# Patient Record
Sex: FEMALE | Race: BLACK OR AFRICAN AMERICAN | NOT HISPANIC OR LATINO | URBAN - METROPOLITAN AREA
[De-identification: names, ages, dates, MRNs, and addresses within clinical notes are randomized per-mention and may not be internally consistent; named-entity substitution may affect disease eponyms.]

---

## 2023-10-03 ENCOUNTER — EMERGENCY (EMERGENCY)
Facility: HOSPITAL | Age: 73
LOS: 1 days | Discharge: HOME | End: 2023-10-03
Attending: EMERGENCY MEDICINE | Admitting: EMERGENCY MEDICINE
Payer: MEDICARE

## 2023-10-03 VITALS
DIASTOLIC BLOOD PRESSURE: 80 MMHG | TEMPERATURE: 98 F | RESPIRATION RATE: 18 BRPM | HEART RATE: 73 BPM | OXYGEN SATURATION: 99 % | SYSTOLIC BLOOD PRESSURE: 134 MMHG

## 2023-10-03 LAB
ALBUMIN SERPL ELPH-MCNC: 4.5 G/DL — SIGNIFICANT CHANGE UP (ref 3.3–5)
ALP SERPL-CCNC: 79 U/L — SIGNIFICANT CHANGE UP (ref 40–120)
ALT FLD-CCNC: 17 U/L — SIGNIFICANT CHANGE UP (ref 4–33)
ANION GAP SERPL CALC-SCNC: 11 MMOL/L — SIGNIFICANT CHANGE UP (ref 7–14)
APTT BLD: 36.5 SEC — HIGH (ref 24.5–35.6)
AST SERPL-CCNC: 18 U/L — SIGNIFICANT CHANGE UP (ref 4–32)
BASOPHILS # BLD AUTO: 0.02 K/UL — SIGNIFICANT CHANGE UP (ref 0–0.2)
BASOPHILS NFR BLD AUTO: 0.4 % — SIGNIFICANT CHANGE UP (ref 0–2)
BILIRUB SERPL-MCNC: 0.5 MG/DL — SIGNIFICANT CHANGE UP (ref 0.2–1.2)
BUN SERPL-MCNC: 12 MG/DL — SIGNIFICANT CHANGE UP (ref 7–23)
CALCIUM SERPL-MCNC: 9.6 MG/DL — SIGNIFICANT CHANGE UP (ref 8.4–10.5)
CHLORIDE SERPL-SCNC: 108 MMOL/L — HIGH (ref 98–107)
CO2 SERPL-SCNC: 24 MMOL/L — SIGNIFICANT CHANGE UP (ref 22–31)
CREAT SERPL-MCNC: 0.91 MG/DL — SIGNIFICANT CHANGE UP (ref 0.5–1.3)
EGFR: 66 ML/MIN/1.73M2 — SIGNIFICANT CHANGE UP
EOSINOPHIL # BLD AUTO: 0.09 K/UL — SIGNIFICANT CHANGE UP (ref 0–0.5)
EOSINOPHIL NFR BLD AUTO: 2 % — SIGNIFICANT CHANGE UP (ref 0–6)
GLUCOSE SERPL-MCNC: 99 MG/DL — SIGNIFICANT CHANGE UP (ref 70–99)
HCT VFR BLD CALC: 42.9 % — SIGNIFICANT CHANGE UP (ref 34.5–45)
HGB BLD-MCNC: 14 G/DL — SIGNIFICANT CHANGE UP (ref 11.5–15.5)
IANC: 2.49 K/UL — SIGNIFICANT CHANGE UP (ref 1.8–7.4)
IMM GRANULOCYTES NFR BLD AUTO: 0.2 % — SIGNIFICANT CHANGE UP (ref 0–0.9)
INR BLD: 1.14 RATIO — SIGNIFICANT CHANGE UP (ref 0.85–1.18)
LYMPHOCYTES # BLD AUTO: 1.57 K/UL — SIGNIFICANT CHANGE UP (ref 1–3.3)
LYMPHOCYTES # BLD AUTO: 34.4 % — SIGNIFICANT CHANGE UP (ref 13–44)
MCHC RBC-ENTMCNC: 26.5 PG — LOW (ref 27–34)
MCHC RBC-ENTMCNC: 32.6 GM/DL — SIGNIFICANT CHANGE UP (ref 32–36)
MCV RBC AUTO: 81.3 FL — SIGNIFICANT CHANGE UP (ref 80–100)
MONOCYTES # BLD AUTO: 0.39 K/UL — SIGNIFICANT CHANGE UP (ref 0–0.9)
MONOCYTES NFR BLD AUTO: 8.5 % — SIGNIFICANT CHANGE UP (ref 2–14)
NEUTROPHILS # BLD AUTO: 2.49 K/UL — SIGNIFICANT CHANGE UP (ref 1.8–7.4)
NEUTROPHILS NFR BLD AUTO: 54.5 % — SIGNIFICANT CHANGE UP (ref 43–77)
NRBC # BLD: 0 /100 WBCS — SIGNIFICANT CHANGE UP (ref 0–0)
NRBC # FLD: 0 K/UL — SIGNIFICANT CHANGE UP (ref 0–0)
PLATELET # BLD AUTO: 282 K/UL — SIGNIFICANT CHANGE UP (ref 150–400)
POTASSIUM SERPL-MCNC: 4.4 MMOL/L — SIGNIFICANT CHANGE UP (ref 3.5–5.3)
POTASSIUM SERPL-SCNC: 4.4 MMOL/L — SIGNIFICANT CHANGE UP (ref 3.5–5.3)
PROT SERPL-MCNC: 7.3 G/DL — SIGNIFICANT CHANGE UP (ref 6–8.3)
PROTHROM AB SERPL-ACNC: 12.8 SEC — SIGNIFICANT CHANGE UP (ref 9.5–13)
RBC # BLD: 5.28 M/UL — HIGH (ref 3.8–5.2)
RBC # FLD: 14.2 % — SIGNIFICANT CHANGE UP (ref 10.3–14.5)
SODIUM SERPL-SCNC: 143 MMOL/L — SIGNIFICANT CHANGE UP (ref 135–145)
WBC # BLD: 4.57 K/UL — SIGNIFICANT CHANGE UP (ref 3.8–10.5)
WBC # FLD AUTO: 4.57 K/UL — SIGNIFICANT CHANGE UP (ref 3.8–10.5)

## 2023-10-03 PROCEDURE — 71045 X-RAY EXAM CHEST 1 VIEW: CPT | Mod: 26

## 2023-10-03 PROCEDURE — 93971 EXTREMITY STUDY: CPT | Mod: 26,LT

## 2023-10-03 PROCEDURE — 99282 EMERGENCY DEPT VISIT SF MDM: CPT

## 2023-10-03 PROCEDURE — 70498 CT ANGIOGRAPHY NECK: CPT | Mod: 26,MA

## 2023-10-03 PROCEDURE — 70496 CT ANGIOGRAPHY HEAD: CPT | Mod: 26,MA

## 2023-10-03 PROCEDURE — 99223 1ST HOSP IP/OBS HIGH 75: CPT

## 2023-10-03 PROCEDURE — 93010 ELECTROCARDIOGRAM REPORT: CPT

## 2023-10-03 RX ORDER — ACETAMINOPHEN 500 MG
650 TABLET ORAL EVERY 6 HOURS
Refills: 0 | Status: DISCONTINUED | OUTPATIENT
Start: 2023-10-03 | End: 2023-10-07

## 2023-10-03 RX ORDER — ATORVASTATIN CALCIUM 80 MG/1
10 TABLET, FILM COATED ORAL DAILY
Refills: 0 | Status: DISCONTINUED | OUTPATIENT
Start: 2023-10-03 | End: 2023-10-07

## 2023-10-03 RX ORDER — DIAZEPAM 5 MG
5 TABLET ORAL ONCE
Refills: 0 | Status: DISCONTINUED | OUTPATIENT
Start: 2023-10-03 | End: 2023-10-04

## 2023-10-03 RX ADMIN — Medication 650 MILLIGRAM(S): at 22:15

## 2023-10-03 RX ADMIN — Medication 650 MILLIGRAM(S): at 23:02

## 2023-10-03 NOTE — ED PROVIDER NOTE - NS ED ATTENDING STATEMENT MOD
This was a shared visit with the JEY. I reviewed and verified the documentation and independently performed the documented:

## 2023-10-03 NOTE — CONSULT NOTE ADULT - ASSESSMENT
73 F w/ left leg pain numbness, CTH with a small sphenoid plannum extra axial calcified meningioma, CTA negative.  Recc:    - no acute neurosurgical intervention   - mri brain w/wo  - mri T/L spine w/wo  - neurology eval    d/w attending

## 2023-10-03 NOTE — ED PROVIDER NOTE - CLINICAL SUMMARY MEDICAL DECISION MAKING FREE TEXT BOX
Genet ROSENBERG: 74 yo F with hx of preDM, high cholesterol here for evaluation of left leg numbness, dizziness. Patient went to urgent care yesterday, was given script for outpatient imaging for possible bleed/ stroke. Today patient felt weak on left side, had associated dizziness. On exam, she is slightly weaker on left leg compared to right. CN and UE strength intact. There is a questionable pronator drift on exam and dysmetria with finger to nose on left. Plan for labs, CT imaging, possible neuro evaluation.

## 2023-10-03 NOTE — CONSULT NOTE ADULT - ASSESSMENT
Impression:      Recommendations:    []  []  []  []  []      Case discussed with Neurology attending Dr. Guerra, to be seen on AM rounds, please await final attending attestation.  HARMEET CH is a 73y (1950) woman with a PMHx significant for pre-DM, HLD, peripheral neuropathy BIBEMS for evaluation of left leg numbness, difficulty ambulating and dizziness.     Impression:    1.  Left leg weakness and difficulty ambulating, chronic neuropathy L>R lower extremity with positive romberg. Patient also endorses pain of L thigh and L calf. Unclear etiology of left leg weakness. Less likely acute infarct or lumber radiculopathy, will need further evaluation   2.  8 x 7 x 4 mm calcified extra-axial lesion arising from the planum sphenoidale, likely incidental finding.     Recommendations:    [] Will follow MRI brain w/ and w/o contrast  [] Will follow results of MRI T/L spine  [] Patient to be re-assessed on AM rounds to evaluate need for further inpatient workup   [] PT/OT evaluation for safe dispo planning      Case discussed with Neurology attending Dr. Guerra, to be seen on AM rounds, please await final attending attestation.  HARMEET CH is a 73y (1950) woman with a PMHx significant for pre-DM, HLD, peripheral neuropathy BIBEMS for evaluation of left leg numbness, difficulty ambulating and dizziness.     Impression:    1.  Left leg weakness and difficulty ambulating, chronic neuropathy L>R lower extremity with positive romberg. Patient also endorses pain of L thigh and L calf. Unclear etiology of left leg weakness. Less likely acute infarct or lumber radiculopathy, will need further evaluation   2.  8 x 7 x 4 mm calcified extra-axial lesion arising from the planum sphenoidale, likely incidental finding.     Recommendations:    [] Will follow MRI brain w/ and w/o contrast  [] Will follow results of MRI T/L spine w/o  [] Patient to be re-assessed on AM rounds to evaluate need for further inpatient workup   [] PT/OT evaluation for safe dispo planning      Case discussed with Neurology attending Dr. Guerra, to be seen on AM rounds, please await final attending attestation.  HARMEET CH is a 73y (1950) woman with a PMHx significant for pre-DM, HLD, peripheral neuropathy BIBEMS for evaluation of left leg numbness, difficulty ambulating and dizziness.     Impression:    1.  Left leg weakness and difficulty ambulating, chronic neuropathy L>R lower extremity with positive romberg. Patient also endorses pain of L thigh and L calf. Unclear etiology of left leg weakness. Less likely acute infarct or lumber radiculopathy, will need further evaluation   2.  8 x 7 x 4 mm calcified extra-axial lesion arising from the planum sphenoidale, likely incidental finding.     Recommendations:    [x] Will follow MRI brain w/ and w/o contrast  [x] Will follow results of MRI T/L spine w/o, lumbar stenosis   [x] Patient to be re-assessed on AM rounds to evaluate need for further inpatient workup   [] PT/OT evaluation for safe dispo planning  [] f/u outpt with neurosurg/spine specialist  [] ophtho outpatient for formal visual fields assessment due to location meningioma (location close to optic chiasm)  [] Patient can follow up with Neurology upon discharge, Dr. Cali Power, [address], [place], [place]; ([phone]).      Case discussed with Neurology attending Dr. Guerra, to be seen on AM rounds, please await final attending attestation.

## 2023-10-03 NOTE — ED ADULT NURSE REASSESSMENT NOTE - NS ED NURSE REASSESS COMMENT FT1
patient came to cdu from ER. alert and orientedx4. complaints of headache. tylenol given  asper order. call bell with in reach. oriented to plan of care. sinus rhythm on tele.

## 2023-10-03 NOTE — ED PROVIDER NOTE - NSICDXPASTMEDICALHX_GEN_ALL_CORE_FT
Neuro: A&Ox4.   Cardiac: SR. VSS. B/P: 145/65, T: 98.3, P: 57, R: 18  Respiratory: Sating >92% on RA, requiring 1-2L NC while asleep.  GI/: Adequate urine output. No BM this shift.  Diet/appetite: Regular diet.  Activity:  Stand by assist  Pain: Denies pain  Skin: No new deficits noted.  LDA's: PIV SL    DVT in RLE  Heparin gtt discontinued, PO Xarelto started  Possible discharge today    Plan: Continue with POC. Notify primary team with changes.     PAST MEDICAL HISTORY:  High cholesterol

## 2023-10-03 NOTE — ED ADULT NURSE NOTE - OBJECTIVE STATEMENT
Pt is alert and orientedx4, ambulatory at baseline. Pt presents to the ED with worsening tingling and numbness in the lower extremities. Pt says she was at urgent care and they advised her to get CT of brain but was not advised to go to ER. Pt then had fall from increased weakness and hurt left foot, no LOC, head trauma or AC use. Pt denies chest pain, shortness of breath, dyspnea on exertion, breathing is unlabored and even. Pt denies nausea, vomiting or diarrhea. 20G IV placed in left AC, labs drawn, awaiting further orders. Call bell within reach, bed in lowest position, will continue to monitor.

## 2023-10-03 NOTE — CONSULT NOTE ADULT - ATTENDING COMMENTS
Chronic left foot numbness and weakness with recent worsening.     Exam:  Motor:  Left leg:   HF 4-  Other muscles in the le+ -->5  Incomplete Chronic left foot numbness and weakness with recent worsening.     Exam:  VFF to CF    Motor:  Left leg:   HF 4-/5  Other muscles in the left le+ -->5/5  Other muscles - 5/5 throughout.     Reflexes 2 throughout.       A/P  Ms. Duque is a 72 yo woman with two issues:  - Severe lumbar stenosis possibly causing left leg symptoms.  Neurosurgery spine specialist f/u.    - Meningioma  Ophthalmology for formal VF testing.   Neurosurgery f/u  D/W patient and CDU team  Thank you

## 2023-10-03 NOTE — ED ADULT NURSE NOTE - NSFALLRISKINTERV_ED_ALL_ED

## 2023-10-03 NOTE — ED PROVIDER NOTE - OBJECTIVE STATEMENT
Genet ROSENBERG: Patient is a 74 yo F with history of pre-DM, high cholesterol BIBEMS for evaluation of left leg numbness and difficulty walking and dizziness. Patient states she has chronic left leg neuropathy which has been worse for the past 3 days. She states she feels a heaviness and tightness in her left leg. She went to urgent care yesterday for this complaint and intermittent dizziness and was given a script for outpatient CT/ MRI imaging. Today, patient states she had tightness, numbness, heaviness in her leg, attempted to sit down because she felt weak and dizzy and fell to the ground. Denies head trauma or loss of consciousness. Patient called her sister who felt the patient sounded bad on the phone. She states patient had labored breathing and was crying. She called 911. Episode lasted about 45 min. Patient reports feeling better when EMS arrived. Patient denies fevers, chills, nausea, vomiting, chest pain, shortness of breath, urinary symptoms, black or bloody stools. She reports dizziness is improved from before.

## 2023-10-03 NOTE — CONSULT NOTE ADULT - CONSULT REASON
Left leg weakness. dizziness, Meningioma Left leg weakness, left leg numbness, dizziness, meningioma

## 2023-10-03 NOTE — CONSULT NOTE ADULT - SUBJECTIVE AND OBJECTIVE BOX
Neurology - Consult Note    -  Spectra: [phone] ([facility]), [phone] ([facility])  -    HPI: Patient HARMEET CH is a 73y (1950) woman with a PMHx significant for pre-DM, high cholesterol BIBEMS for evaluation of left leg numbness and difficulty walking and dizziness. Patient states she has chronic left leg neuropathy which has been worse for the past 3 days. She states she feels a heaviness and tightness in her left leg. She went to urgent care yesterday for this complaint and intermittent dizziness and was given a script for outpatient CT/ MRI imaging. Today, patient states she had tightness, numbness, heaviness in her leg, attempted to sit down because she felt weak and dizzy and fell to the ground. Denies head trauma or loss of consciousness.       Patient called her sister who felt the patient sounded bad on the phone. She states patient had labored breathing and was crying. She called 911. Episode lasted about 45 min. Patient reports feeling better when EMS arrived. Patient denies fevers, chills, nausea, vomiting, chest pain, shortness of breath, urinary symptoms, black or bloody stools. She reports dizziness is improved from before.    Review of Systems:  INCOMPLETE   CONSTITUTIONAL: No fevers or chills  EYES AND ENT: No visual changes or no throat pain   NECK: No pain or stiffness  RESPIRATORY: No hemoptysis or shortness of breath  CARDIOVASCULAR: No chest pain or palpitations  GASTROINTESTINAL: No melena or hematochezia  GENITOURINARY: No dysuria or hematuria  NEUROLOGICAL: +As stated in HPI above  SKIN: No itching, burning, rashes, or lesions   All other review of systems is negative unless indicated above.    Allergies:  No Known Drug Allergies  Seafood (Hives)      PMHx/PSHx/Family Hx: As above, otherwise see below   High cholesterol        Social Hx:  No current use of tobacco, alcohol, or illicit drugs  Lives with ***    Medications:  MEDICATIONS  (STANDING):    MEDICATIONS  (PRN):      Vitals:  T(C): 36.7 (10-03-23 @ 13:56), Max: 36.7 (10-03-23 @ 13:56)  HR: 73 (10-03-23 @ 13:56) (73 - 73)  BP: 134/80 (10-03-23 @ 13:56) (134/80 - 134/80)  RR: 18 (10-03-23 @ 13:56) (18 - 18)  SpO2: 99% (10-03-23 @ 13:56) (99% - 99%)    Physical Examination: INCOMPLETE  General - NAD  Cardiovascular - Peripheral pulses palpable, no edema  Eyes - Fundoscopy with flat, sharp optic discs and no hemorrhage or exudates; Fundoscopy not well visualized; Fundoscopy not performed due to safety precautions in the setting of the COVID-19 pandemic    Neurologic Exam:  Mental status - Awake, Alert, Oriented to person, place, and time. Speech fluent, repetition and naming intact. Follows simple and complex commands. Attention/concentration, recent and remote memory (including registration and recall), and fund of knowledge intact    Cranial nerves - PERRLA, VFF, EOMI, face sensation (V1-V3) intact b/l, facial strength intact without asymmetry b/l, hearing intact b/l, palate with symmetric elevation, trapezius OR sternocleidomastiod 5/5 strength b/l, tongue midline on protrusion with full lateral movement    Motor - Normal bulk and tone throughout. No pronator drift.  Strength testing            Deltoid      Biceps      Triceps     Wrist Extension    Wrist Flexion     Interossei         R            5                 5               5                     5                              5                        5                 5  L             5                 5               5                     5                              5                        5                 5              Hip Flexion    Hip Extension    Knee Flexion    Knee Extension    Dorsiflexion    Plantar Flexion  R              5                           5                       5                           5                            5                          5  L              5                           5                        5                           5                            5                          5    Sensation - Light touch/temperature OR pain/vibration intact throughout    DTR's -             Biceps      Triceps     Brachioradialis      Patellar    Ankle    Toes/plantar response  R             2+             2+                  2+                       2+            2+                 Down  L              2+             2+                 2+                        2+           2+                 Down    Coordination - Finger to Nose intact b/l. No tremors appreciated    Gait and station - Normal casual gait. Romberg (-)    Labs:                        14.0   4.57  )-----------( 282      ( 03 Oct 2023 16:30 )             42.9     10-03    143  |  108<H>  |  12  ----------------------------<  99  4.4   |  24  |  0.91    Ca    9.6      03 Oct 2023 16:30    TPro  7.3  /  Alb  4.5  /  TBili  0.5  /  DBili  x   /  AST  18  /  ALT  17  /  AlkPhos  79  10-03    CAPILLARY BLOOD GLUCOSE      POCT Blood Glucose.: 77 mg/dL (03 Oct 2023 17:24)    LIVER FUNCTIONS - ( 03 Oct 2023 16:30 )  Alb: 4.5 g/dL / Pro: 7.3 g/dL / ALK PHOS: 79 U/L / ALT: 17 U/L / AST: 18 U/L / GGT: x             PT/INR - ( 03 Oct 2023 16:30 )   PT: 12.8 sec;   INR: 1.14 ratio         PTT - ( 03 Oct 2023 16:30 )  PTT:36.5 sec  CSF:                  Radiology:       Neurology - Consult Note    -  Spectra: [phone] ([facility]), [phone] ([facility])  -    HPI: Patient HARMEET CH is a 73y (1950) woman with a PMHx significant for pre-DM, HLD, peripheral neuropathy BIBEMS for evaluation of left leg numbness, difficulty ambulating and dizziness. Patient states she has chronic left leg neuropathy (2 years) which has been worse for the past 3 days. She states she feels a heaviness and tightness in her left leg. She went to urgent care yesterday for this complaint and intermittent dizziness and was given a script for outpatient CT/ MRI imaging. Today, patient states she had tightness, numbness, heaviness in her left leg, attempted to sit down because she felt weak and dizzy and fell to the ground around 1PM. Initially reported as a room spinning dizziness, patient reports she also felt as if she was spinning and that the dizziness improves with rest. Denies head trauma or loss of consciousness.     Patient called her sister who felt the patient sounded poorly on the phone. She states patient had labored breathing and was crying. Episode lasted about 45 min. Patient reports feeling better when EMS arrived. Patient denies fevers, chills, nausea, vomiting, chest pain, shortness of breath, urinary symptoms, black or bloody stools. She reports dizziness is improved from before. History is provided by sister and patient, however patient is poor historian, unclear if there is underlying cognitive decline. Patient able to ambulate while in ED. She endorses left calf and left thigh pain. Lower extremity duplex was negative for DVT. CT head was done which showed  8 x 7 x 4 mm calcified extra-axial lesion arising from the   planum sphenoidale. CTA H/N was unremarkable. Per neurosurgery, meningioma is likely incidental finding. Patient was seen by Podiatrist 2 years ago for left foot parathesias (she was not told etiology of neuropathy and was given medication for it, however she is off medication now and has not followed with podiatry. Has not seen Neurology outpatient.   Patient takes a statin daily, not on AC/AP.     Review of Systems:   CONSTITUTIONAL: No fevers or chills  EYES AND ENT: No visual changes or no throat pain   NECK: No pain or stiffness  RESPIRATORY: No hemoptysis or shortness of breath  CARDIOVASCULAR: No chest pain or palpitations  GASTROINTESTINAL: No melena or hematochezia  GENITOURINARY: No dysuria or hematuria  NEUROLOGICAL: +As stated in HPI above  SKIN: No itching, burning, rashes, or lesions   All other review of systems is negative unless indicated above.    Allergies:  No Known Drug Allergies  Seafood (Hives)    PMHx/PSHx/Family Hx: As above, otherwise see below   High cholesterol    Social Hx:  Former smoker but quit years ago. Occasionally drinks wine, denies illicit drug use  Lives alone, she is retired. Independent with ADLs, ambulates independently     Medications:  MEDICATIONS  (STANDING):    MEDICATIONS  (PRN):      Vitals:  T(C): 36.7 (10-03-23 @ 13:56), Max: 36.7 (10-03-23 @ 13:56)  HR: 73 (10-03-23 @ 13:56) (73 - 73)  BP: 134/80 (10-03-23 @ 13:56) (134/80 - 134/80)  RR: 18 (10-03-23 @ 13:56) (18 - 18)  SpO2: 99% (10-03-23 @ 13:56) (99% - 99%)    Physical Examination:   General - NAD  Cardiovascular - Peripheral pulses palpable, no edema  Eyes - Fundoscopy not performed due to safety precautions in the setting of the COVID-19 pandemic    Neurologic Exam:  Mental status - Awake, Alert, Oriented to person, place, and time (month and year). Speech fluent, repetition and naming intact. Follows simple. Attention/concentration, memory grossly intact, and fund of knowledge intact    Cranial nerves - PERRLA, VFF, EOMI. No nystagmus noted. Face sensation (V1-V3) intact b/l, facial strength intact without asymmetry b/l, hearing intact b/l, palate with symmetric elevation, trapezius 4+/5 strength b/l, tongue midline on protrusion with full lateral movement    Motor - Normal bulk and tone throughout. No pronator drift.  Strength testing            Deltoid      Biceps      Triceps     Wrist Extension    Wrist Flexion     Interossei         R            5                 5               5                     5                              5                        5                 5  L             5                 5               5                     5                              5                        5                 5              Hip Flexion    Hip Extension    Knee Flexion    Knee Extension    Dorsiflexion    Plantar Flexion  R              5                          5                    5                       5                  5                          5  L              4                           4+                  4+                    4                  5                          5    Sensation - Light touch, temperature, vibration, intact throughout. Proprioception intact B/L     DTR's -             Biceps      Triceps     Brachioradialis      Patellar    Ankle    Toes/plantar response  R             2+             2+                  2+              2+            2+                 Down  L              2+             2+                 2+               2+           2+                 Neutral     Coordination - Finger to Nose intact b/l. Heel to shin normal on L. On R, limited by left thigh pain. Mild intention tremor B/L    Gait and station - Normal casual gait. Romberg positive    Labs:                        14.0   4.57  )-----------( 282      ( 03 Oct 2023 16:30 )             42.9     10-03    143  |  108<H>  |  12  ----------------------------<  99  4.4   |  24  |  0.91    Ca    9.6      03 Oct 2023 16:30    TPro  7.3  /  Alb  4.5  /  TBili  0.5  /  DBili  x   /  AST  18  /  ALT  17  /  AlkPhos  79  10-03    CAPILLARY BLOOD GLUCOSE      POCT Blood Glucose.: 77 mg/dL (03 Oct 2023 17:24)    LIVER FUNCTIONS - ( 03 Oct 2023 16:30 )  Alb: 4.5 g/dL / Pro: 7.3 g/dL / ALK PHOS: 79 U/L / ALT: 17 U/L / AST: 18 U/L / GGT: x             PT/INR - ( 03 Oct 2023 16:30 )   PT: 12.8 sec;   INR: 1.14 ratio         PTT - ( 03 Oct 2023 16:30 )  PTT:36.5 sec    Radiology:    < from: CT Angio Neck w/ IV Cont (10.03.23 @ 17:01) >  CT BRAIN with and without contrast:  There is no acutehemorrhage, midline shift, or abnormal extra-axial   fluid collection.  There is an 8 x 7 x 4 mm calcified extra-axial lesion arising from the   planum sphenoidale suggesting a meningioma.    Ventricles, sulci, and cisterns are normal in size for thepatient's age.   No hydrocephalus. Basal cisterns are patent.    Paranasal sinuses and mastoid air cells are clear. Calvarium is intact.  Bilateral lens replacements.    CT ANGIOGRAPHY NECK:  There is no evidence for significant stenosis or major vessel occlusion   involving the bilateral carotid arteries.    There is no evidence for significant stenosis or major vessel occlusion   involving the bilateral vertebral arteries.    Partially visualized lungs are clear.    Multiple bilateral thyroid nodules measuring up to 1.2 cm in the left   isthmus.    Visualized osseous structures are unremarkable.      CT ANGIOGRAPHY BRAIN:    There is no evidence for significant stenosis or major vessel occlusion   about the [place].    There is no evidence for significant stenosis, major vessel occlusion, or   aneurysm involving the bilateral vertebral arteries. Basilar artery is   hypoplastic. Distal basilar artery is slightly ectatic.    No enlarged vascular lesions or clusters of abnormal vessels are noted to   suggest an arterial venous malformation within the field-of-view.      IMPRESSION:    CT BRAIN:  1.  There is an 8 x 7 x 4 mm calcified extra-axial lesion arising from   the planum sphenoidale suggesting a meningioma. This can be further   evaluated with nonemergent MRI brain with and without contrast.  2.  No acute intracranial hemorrhage, midline shift or extra-axial fluid   collection.    CT ANGIOGRAPHY NECK:  No evidence of hemodynamically significant stenosis using NASCET  criteria. Patent vertebral arteries. No evidence of vascular dissection.    CT ANGIOGRAPHY BRAIN:  No evidence of aneurysm.  No major vessel occlusion or proximal stenosis.       Neurology - Consult Note    -  Spectra: [phone] ([facility]), [phone] ([facility])  -    HPI: Patient HARMEET CH is a 73y (1950) woman with a PMHx significant for pre-DM, HLD, peripheral neuropathy BIBEMS for evaluation of left leg numbness, difficulty ambulating and dizziness. Patient states she has chronic left leg neuropathy (2 years) which has been worse for the past 3 days. She states she feels a heaviness and tightness in her left leg. She went to urgent care yesterday for this complaint and intermittent dizziness and was given a script for outpatient CT/ MRI imaging. Today, patient states she had tightness, numbness, heaviness in her left leg, attempted to sit down because she felt weak and dizzy and fell to the ground around 1PM. Initially reported as a room spinning dizziness, patient reports she also felt as if she was spinning and that the dizziness improves with rest. Denies head trauma or loss of consciousness.     Patient called her sister who felt the patient sounded poorly on the phone. She states patient had labored breathing and was crying. Episode lasted about 45 min. Patient reports feeling better when EMS arrived. Patient denies fevers, chills, nausea, vomiting, chest pain, shortness of breath, urinary symptoms, black or bloody stools. She reports dizziness is improved from before. History is provided by sister and patient, however patient is poor historian, unclear if there is underlying cognitive decline. Patient able to ambulate while in ED. She endorses left calf and left thigh pain. Lower extremity duplex was negative for DVT. CT head was done which showed  8 x 7 x 4 mm calcified extra-axial lesion arising from the planum sphenoidale. CTA H/N was unremarkable. Per neurosurgery, meningioma is likely incidental finding. Patient was seen by Podiatrist 2 years ago for left foot parathesias (she was not told etiology of neuropathy and was given medication for it, however she is off medication now and has not followed with podiatry. Has not seen Neurology outpatient.   Patient takes a statin daily, not on AC/AP.     Review of Systems:   CONSTITUTIONAL: No fevers or chills  EYES AND ENT: No visual changes or no throat pain   NECK: No pain or stiffness  RESPIRATORY: No hemoptysis or shortness of breath  CARDIOVASCULAR: No chest pain or palpitations  GASTROINTESTINAL: No melena or hematochezia  GENITOURINARY: No dysuria or hematuria  NEUROLOGICAL: +As stated in HPI above  SKIN: No itching, burning, rashes, or lesions   All other review of systems is negative unless indicated above.    Allergies:  No Known Drug Allergies  Seafood (Hives)    PMHx/PSHx/Family Hx: As above, otherwise see below   High cholesterol    Social Hx:  Former smoker but quit years ago. Occasionally drinks wine, denies illicit drug use  Lives alone, she is retired. Independent with ADLs, ambulates independently     Medications:  MEDICATIONS  (STANDING):    MEDICATIONS  (PRN):      Vitals:  T(C): 36.7 (10-03-23 @ 13:56), Max: 36.7 (10-03-23 @ 13:56)  HR: 73 (10-03-23 @ 13:56) (73 - 73)  BP: 134/80 (10-03-23 @ 13:56) (134/80 - 134/80)  RR: 18 (10-03-23 @ 13:56) (18 - 18)  SpO2: 99% (10-03-23 @ 13:56) (99% - 99%)    Physical Examination:   General - NAD  Cardiovascular - Peripheral pulses palpable, no edema  Eyes - Fundoscopy not performed due to safety precautions in the setting of the COVID-19 pandemic    Neurologic Exam:  Mental status - Awake, Alert, Oriented to person, place, and time (month and year). Speech fluent, repetition and naming intact. Follows simple. Attention/concentration, memory grossly intact, and fund of knowledge intact    Cranial nerves - PERRLA, VFF, EOMI. No nystagmus noted. Face sensation (V1-V3) intact b/l, facial strength intact without asymmetry b/l, hearing intact b/l, palate with symmetric elevation, trapezius 4+/5 strength b/l, tongue midline on protrusion with full lateral movement    Motor - Normal bulk and tone throughout. No pronator drift.  Strength testing            Deltoid      Biceps      Triceps     Wrist Extension    Wrist Flexion     Interossei         R            5                 5               5                     5                              5                        5                 5  L             5                 5               5                     5                              5                        5                 5              Hip Flexion    Hip Extension    Knee Flexion    Knee Extension    Dorsiflexion    Plantar Flexion  R              5                          5                    5                       5                  5                          5  L              4                           4+                  4+                    4                  5                          5    Sensation - Light touch, temperature, vibration, intact throughout. Proprioception intact B/L     DTR's -             Biceps      Triceps     Brachioradialis      Patellar    Ankle    Toes/plantar response  R             2+             2+                  2+              2+            2+                 Down  L              2+             2+                 2+               2+           2+                 Neutral     Coordination - Finger to Nose intact b/l. Heel to shin normal on L. On R, limited by left thigh pain. Mild intention tremor B/L    Gait and station - Normal casual gait. Romberg positive    Labs:                        14.0   4.57  )-----------( 282      ( 03 Oct 2023 16:30 )             42.9     10-03    143  |  108<H>  |  12  ----------------------------<  99  4.4   |  24  |  0.91    Ca    9.6      03 Oct 2023 16:30    TPro  7.3  /  Alb  4.5  /  TBili  0.5  /  DBili  x   /  AST  18  /  ALT  17  /  AlkPhos  79  10-03    CAPILLARY BLOOD GLUCOSE      POCT Blood Glucose.: 77 mg/dL (03 Oct 2023 17:24)    LIVER FUNCTIONS - ( 03 Oct 2023 16:30 )  Alb: 4.5 g/dL / Pro: 7.3 g/dL / ALK PHOS: 79 U/L / ALT: 17 U/L / AST: 18 U/L / GGT: x             PT/INR - ( 03 Oct 2023 16:30 )   PT: 12.8 sec;   INR: 1.14 ratio         PTT - ( 03 Oct 2023 16:30 )  PTT:36.5 sec    Radiology:    < from: CT Angio Neck w/ IV Cont (10.03.23 @ 17:01) >  CT BRAIN with and without contrast:  There is no acutehemorrhage, midline shift, or abnormal extra-axial   fluid collection.  There is an 8 x 7 x 4 mm calcified extra-axial lesion arising from the   planum sphenoidale suggesting a meningioma.    Ventricles, sulci, and cisterns are normal in size for thepatient's age.   No hydrocephalus. Basal cisterns are patent.    Paranasal sinuses and mastoid air cells are clear. Calvarium is intact.  Bilateral lens replacements.    CT ANGIOGRAPHY NECK:  There is no evidence for significant stenosis or major vessel occlusion   involving the bilateral carotid arteries.    There is no evidence for significant stenosis or major vessel occlusion   involving the bilateral vertebral arteries.    Partially visualized lungs are clear.    Multiple bilateral thyroid nodules measuring up to 1.2 cm in the left   isthmus.    Visualized osseous structures are unremarkable.      CT ANGIOGRAPHY BRAIN:    There is no evidence for significant stenosis or major vessel occlusion   about the [place].    There is no evidence for significant stenosis, major vessel occlusion, or   aneurysm involving the bilateral vertebral arteries. Basilar artery is   hypoplastic. Distal basilar artery is slightly ectatic.    No enlarged vascular lesions or clusters of abnormal vessels are noted to   suggest an arterial venous malformation within the field-of-view.      IMPRESSION:    CT BRAIN:  1.  There is an 8 x 7 x 4 mm calcified extra-axial lesion arising from   the planum sphenoidale suggesting a meningioma. This can be further   evaluated with nonemergent MRI brain with and without contrast.  2.  No acute intracranial hemorrhage, midline shift or extra-axial fluid   collection.    CT ANGIOGRAPHY NECK:  No evidence of hemodynamically significant stenosis using NASCET  criteria. Patent vertebral arteries. No evidence of vascular dissection.    CT ANGIOGRAPHY BRAIN:  No evidence of aneurysm.  No major vessel occlusion or proximal stenosis.       Neurology - Consult Note    -  Spectra: [phone] ([facility]), [phone] ([facility])  -    HPI: Patient HARMEET CH is a 73y (1950) woman with a PMHx significant for pre-DM, HLD, peripheral neuropathy BIBEMS for evaluation of left leg numbness, difficulty ambulating and dizziness. Patient states she has chronic left leg neuropathy (2 years) which has been worse for the past 3 days. She states she feels a heaviness and tightness in her left leg. She went to urgent care yesterday for this complaint and intermittent dizziness and was given a script for outpatient CT/ MRI imaging. Today, patient states she had tightness, numbness, heaviness in her left leg, attempted to sit down because she felt weak and dizzy and fell to the ground around 1PM. Initially reported as a room spinning dizziness, patient reports she also felt as if she was spinning and that the dizziness improves with rest. Denies head trauma or loss of consciousness.     Patient called her sister who felt the patient sounded poorly on the phone. She states patient had labored breathing and was crying. Episode lasted about 45 min. Patient reports feeling better when EMS arrived. Patient denies fevers, chills, nausea, vomiting, chest pain, shortness of breath, urinary symptoms, black or bloody stools. She reports dizziness is improved from before. History is provided by sister and patient, however patient is poor historian, unclear if there is underlying cognitive decline. Patient able to ambulate while in ED. She endorses left calf and left thigh pain. Lower extremity duplex was negative for DVT. CT head was done which showed  8 x 7 x 4 mm calcified extra-axial lesion arising from the planum sphenoidale. CTA H/N was unremarkable. Per neurosurgery, meningioma is likely incidental finding. Patient was seen by Podiatrist 2 years ago for left foot parathesias (she was not told etiology of neuropathy and was given medication for it, however she is off medication now and has not followed with podiatry. Has not seen Neurology outpatient.   Patient takes a statin daily, not on AC/AP.     Review of Systems:   CONSTITUTIONAL: No fevers or chills  EYES AND ENT: No visual changes or no throat pain   NECK: No pain or stiffness  RESPIRATORY: No hemoptysis or shortness of breath  CARDIOVASCULAR: No chest pain or palpitations  GASTROINTESTINAL: No melena or hematochezia  GENITOURINARY: No dysuria or hematuria  NEUROLOGICAL: +As stated in HPI above  SKIN: No itching, burning, rashes, or lesions   All other review of systems is negative unless indicated above.    Allergies:  No Known Drug Allergies  Seafood (Hives)    PMHx/PSHx/Family Hx: As above, otherwise see below   High cholesterol    Social Hx:  Former smoker but quit years ago. Occasionally drinks wine, denies illicit drug use  Lives alone, she is retired. Independent with ADLs, ambulates independently     Medications:  MEDICATIONS  (STANDING):    MEDICATIONS  (PRN):      Vitals:  T(C): 36.7 (10-03-23 @ 13:56), Max: 36.7 (10-03-23 @ 13:56)  HR: 73 (10-03-23 @ 13:56) (73 - 73)  BP: 134/80 (10-03-23 @ 13:56) (134/80 - 134/80)  RR: 18 (10-03-23 @ 13:56) (18 - 18)  SpO2: 99% (10-03-23 @ 13:56) (99% - 99%)    Physical Examination:   General - NAD  Cardiovascular - Peripheral pulses palpable, no edema  Eyes - Fundoscopy not performed due to safety precautions in the setting of the COVID-19 pandemic    Neurologic Exam:  Mental status - Awake, Alert, Oriented to person, place, and time (month and year). Speech fluent, repetition and naming intact. Follows simple. Attention/concentration, memory grossly intact, and fund of knowledge intact    Cranial nerves - PERRLA, VFF, EOMI. No nystagmus noted. Face sensation (V1-V3) intact b/l, facial strength intact without asymmetry b/l, hearing intact b/l, palate with symmetric elevation, trapezius 4+/5 strength b/l, tongue midline on protrusion with full lateral movement    Motor - Normal bulk and tone throughout. No pronator drift.  Strength testing            Deltoid      Biceps      Triceps     Wrist Extension    Wrist Flexion     Interossei         R            5                 5               5                     5                              5                        5                 5  L             5                 5               5                     5                              5                        5                 5              Hip Flexion    Hip Extension    Knee Flexion    Knee Extension    Dorsiflexion    Plantar Flexion  R              5                          5                    5                       5                  5                          5  L              4                           4+                  4+                    4                  5                          5    Sensation - Light touch, temperature, vibration, intact throughout. Proprioception intact B/L     DTR's -             Biceps      Triceps     Brachioradialis      Patellar    Ankle    Toes/plantar response  R             2+             2+                  2+              2+            2+                 Down  L              2+             2+                 2+               2+           2+                 Neutral     Coordination - Finger to Nose intact b/l. Heel to shin normal on L. On R, limited by left thigh pain. Mild intention tremor B/L    Gait and station - Normal casual gait. Romberg positive    Labs:                        14.0   4.57  )-----------( 282      ( 03 Oct 2023 16:30 )             42.9     10-03    143  |  108<H>  |  12  ----------------------------<  99  4.4   |  24  |  0.91    Ca    9.6      03 Oct 2023 16:30    TPro  7.3  /  Alb  4.5  /  TBili  0.5  /  DBili  x   /  AST  18  /  ALT  17  /  AlkPhos  79  10-03    CAPILLARY BLOOD GLUCOSE      POCT Blood Glucose.: 77 mg/dL (03 Oct 2023 17:24)    LIVER FUNCTIONS - ( 03 Oct 2023 16:30 )  Alb: 4.5 g/dL / Pro: 7.3 g/dL / ALK PHOS: 79 U/L / ALT: 17 U/L / AST: 18 U/L / GGT: x             PT/INR - ( 03 Oct 2023 16:30 )   PT: 12.8 sec;   INR: 1.14 ratio         PTT - ( 03 Oct 2023 16:30 )  PTT:36.5 sec    Radiology:    < from: CT Angio Neck w/ IV Cont (10.03.23 @ 17:01) >  CT BRAIN with and without contrast:  There is no acutehemorrhage, midline shift, or abnormal extra-axial   fluid collection.  There is an 8 x 7 x 4 mm calcified extra-axial lesion arising from the   planum sphenoidale suggesting a meningioma.    Ventricles, sulci, and cisterns are normal in size for thepatient's age.   No hydrocephalus. Basal cisterns are patent.    Paranasal sinuses and mastoid air cells are clear. Calvarium is intact.  Bilateral lens replacements.    CT ANGIOGRAPHY NECK:  There is no evidence for significant stenosis or major vessel occlusion   involving the bilateral carotid arteries.    There is no evidence for significant stenosis or major vessel occlusion   involving the bilateral vertebral arteries.    Partially visualized lungs are clear.    Multiple bilateral thyroid nodules measuring up to 1.2 cm in the left   isthmus.    Visualized osseous structures are unremarkable.      CT ANGIOGRAPHY BRAIN:    There is no evidence for significant stenosis or major vessel occlusion   about the [place].    There is no evidence for significant stenosis, major vessel occlusion, or   aneurysm involving the bilateral vertebral arteries. Basilar artery is   hypoplastic. Distal basilar artery is slightly ectatic.    No enlarged vascular lesions or clusters of abnormal vessels are noted to   suggest an arterial venous malformation within the field-of-view.      IMPRESSION:    CT BRAIN:  1.  There is an 8 x 7 x 4 mm calcified extra-axial lesion arising from   the planum sphenoidale suggesting a meningioma. This can be further   evaluated with nonemergent MRI brain with and without contrast.  2.  No acute intracranial hemorrhage, midline shift or extra-axial fluid   collection.    CT ANGIOGRAPHY NECK:  No evidence of hemodynamically significant stenosis using NASCET  criteria. Patent vertebral arteries. No evidence of vascular dissection.    CT ANGIOGRAPHY BRAIN:  No evidence of aneurysm.  No major vessel occlusion or proximal stenosis.    MRI LUMBAR  1. Grade 1 anterolisthesis L4 over L5 and grade 1 retrolisthesis L5 over S1, with severe facet arthrosis at these levels.  2. L4-L5 anterolisthesis with a broad-based left foraminal-lateral disc herniation superimposed upon a disc bulge, resulting in severe central canal, bilateral lateral recess, moderate left and mild right neural foramen stenosis with facet arthrosis and ligamentum flavum hypertrophy.  3. L5-S1 retrolisthesis with a disc bulge and facet arthrosis, resulting in mild central canal, bilateral lateral recess and mild bilateral neural foramen stenosis, contacting bilateral S1 nerve roots.  4. approximate 1.5 cm probable complex cystic pancreatic head lesion incidentally noted, of indeterminate neoplastic-malignant potential.    MRI Brain  -partially rim calcified extra-axial enhancing mass is seen off the posterior planum sphenoidale more asymmetric towards the left side and abutting the parasellar margin and also abutting the cisternal segment of the left optic nerve immediately prior to the optic chiasm measuring 0.5 x 0.6 x 0.7 cm (AP x TRV x CC).   -The mass also abuts the left A1 segment. No surrounding mass effect nor and vasogenic edema is seen.  -A chronic lacunar infarct is seen within the left side of the jalen.  -No acute intracranial hemorrhage or evidence of acute ischemia.

## 2023-10-03 NOTE — ED CDU PROVIDER INITIAL DAY NOTE - CLINICAL SUMMARY MEDICAL DECISION MAKING FREE TEXT BOX
Patient is a 74 yo F with history of pre-DM, high cholesterol BIBEMS for evaluation of left leg numbness and difficulty walking and dizziness. Patient states she has chronic left leg neuropathy which has been worse for the past 3 days.  In ED patient lab analysis, unremarkable, pending troponin. CTA Head shows "there is an 8 x 7 x 4 mm calcified extra-axial lesion arising from the planum sphenoidale suggesting a meningioma. This can be further evaluated with nonemergent MRI brain with and without contrast. No acute intracranial hemorrhage, midline shift or extra-axial fluid collection." Pt evaluated by neurology who recommends MRI Brain w/w/o contrast to further characterize findings above in addition to ruling out CVA. Dispo pending. Patient is a 72 yo F with history of pre-DM, high cholesterol BIBEMS for evaluation of left leg numbness and difficulty walking and dizziness. Patient states she has chronic left leg neuropathy which has been worse for the past 3 days.  In ED patient lab analysis, unremarkable, pending troponin. CTA Head shows "there is an 8 x 7 x 4 mm calcified extra-axial lesion arising from the planum sphenoidale suggesting a meningioma. This can be further evaluated with nonemergent MRI brain with and without contrast. No acute intracranial hemorrhage, midline shift or extra-axial fluid collection." Pt evaluated by neurology who recommends MRI Brain w/w/o contrast to further characterize findings above in addition to ruling out CVA. Dispo pending.    Genet ROSENBERG: Agree with above. Patient in CDU for MRI imaging, final neuro recs. Discussed all results with patient and her sister.

## 2023-10-03 NOTE — ED CDU PROVIDER INITIAL DAY NOTE - PHYSICAL EXAMINATION
CONSTITUTIONAL: Well-appearing; well-nourished; in no apparent distress. Non-toxic appearing.   NEURO: Alert, oriented x 3. Gait steady without assistance. No facial droop. Tongue protrudes midline. Strength to upper and lower extremities 5/5. No pronator drip.   EYES: PERRL, EOMs intact, no nystagmus.   PSYCH: Mood appropriate. Thought processes intact.   NECK: Supple  CARD: Regular rate and rhythm, no murmurs  RESP: No accessory muscle use; breath sounds clear and equal bilaterally; no wheezes, rhonchi, or rales     ABD: Soft; non-distended; non-tender. No guarding or rebound.   MUSCULOSKELETAL/EXTREMITIES: FROM in all four extremities; no extremity edema.  SKIN: Warm; dry; no apparent lesions or exudate

## 2023-10-03 NOTE — ED CDU PROVIDER INITIAL DAY NOTE - OBJECTIVE STATEMENT
Genet ROSENBERG: Patient is a 74 yo F with history of pre-DM, high cholesterol BIBEMS for evaluation of left leg numbness and difficulty walking and dizziness. Patient states she has chronic left leg neuropathy which has been worse for the past 3 days. She states she feels a heaviness and tightness in her left leg. She went to urgent care yesterday for this complaint and intermittent dizziness and was given a script for outpatient CT/ MRI imaging. Today, patient states she had tightness, numbness, heaviness in her leg, attempted to sit down because she felt weak and dizzy and fell to the ground. Denies head trauma or loss of consciousness. Patient called her sister who felt the patient sounded bad on the phone. She states patient had labored breathing and was crying. She called 911. Episode lasted about 45 min. Patient reports feeling better when EMS arrived. Patient denies fevers, chills, nausea, vomiting, chest pain, shortness of breath, urinary symptoms, black or bloody stools. She reports dizziness is improved from before.    CDU DIAMANTE Pal: Agree with above. At present, patient feeling well and notes dizziness has since improved. Pt denies heaviness in her leg and states it feels as it normally does. Denies CP, SOB, HA, cough, fever, chills, abd pain, NVD. In ED patient lab analysis, unremarkable, pending troponin. CTA Head shows "there is an 8 x 7 x 4 mm calcified extra-axial lesion arising from the planum sphenoidale suggesting a meningioma. This can be further evaluated with nonemergent MRI brain with and without contrast. No acute intracranial hemorrhage, midline shift or extra-axial fluid collection." Pt evaluated by neurology who recommends MRI Brain w/w/o contrast to further characterize findings above in addition to ruling out CVA. Pt agreeable with this plan.

## 2023-10-03 NOTE — CONSULT NOTE ADULT - SUBJECTIVE AND OBJECTIVE BOX
NEUROSURGERY CONSULT    HPI: 73y Female pmhx htn, dm, hld, chronic neuropathy p/w L. leg numbness, worsening pain in left leg, dizziness, difficulty ambulating and fall today, did not hit her head.    RADIOLOGY: IMPRESSION:    CT BRAIN:  1.  There is an 8 x 7 x 4 mm calcified extra-axial lesion arising from   the planum sphenoidale suggesting a meningioma. This can be further   evaluated with nonemergent MRI brain with and without contrast.  2.  No acute intracranial hemorrhage, midline shift or extra-axial fluid   collection.    CT ANGIOGRAPHY NECK:  No evidence of hemodynamically significant stenosis using NASCET  criteria. Patent vertebral arteries. No evidence of vascular dissection.    CT ANGIOGRAPHY BRAIN:  No evidence of aneurysm.  No major vessel occlusion or proximal stenosis.    MEDS:  acetaminophen     Tablet .. 650 milliGRAM(s) Oral every 6 hours PRN        Vital Signs Last 24 Hrs  T(C): 36.4 (03 Oct 2023 20:17), Max: 36.7 (03 Oct 2023 13:56)  T(F): 97.5 (03 Oct 2023 20:17), Max: 98 (03 Oct 2023 13:56)  HR: 86 (03 Oct 2023 20:17) (73 - 86)  BP: 136/78 (03 Oct 2023 20:17) (134/80 - 136/78)  BP(mean): --  RR: 18 (03 Oct 2023 20:17) (18 - 18)  SpO2: 97% (03 Oct 2023 20:17) (97% - 99%)    Parameters below as of 03 Oct 2023 20:17  Patient On (Oxygen Delivery Method): room air        LABS:                        14.0   4.57  )-----------( 282      ( 03 Oct 2023 16:30 )             42.9     10-03    143  |  108<H>  |  12  ----------------------------<  99  4.4   |  24  |  0.91    Ca    9.6      03 Oct 2023 16:30    TPro  7.3  /  Alb  4.5  /  TBili  0.5  /  DBili  x   /  AST  18  /  ALT  17  /  AlkPhos  79  10-03    PT/INR - ( 03 Oct 2023 16:30 )   PT: 12.8 sec;   INR: 1.14 ratio         PTT - ( 03 Oct 2023 16:30 )  PTT:36.5 sec      PHYSICAL EXAM: awake, A&ox3, fc  perrl  sanchez 5/5  silt

## 2023-10-03 NOTE — ED ADULT TRIAGE NOTE - CHIEF COMPLAINT QUOTE
Pt AOX4 c/o Left sided numbness, weakness, seen in urgi care yesterday told to get CT of brain (but not sent to ER or advised to go to ER); today weakness and numbness got worse and it caused her to fall, no LOC, some Left foot pain s/p fall; no focal defecits in triage, no facial droop  Has had this tingling and intermittent weakness  in legs for years but this is a worsening level;

## 2023-10-04 VITALS
TEMPERATURE: 98 F | SYSTOLIC BLOOD PRESSURE: 157 MMHG | RESPIRATION RATE: 16 BRPM | OXYGEN SATURATION: 100 % | HEART RATE: 89 BPM | DIASTOLIC BLOOD PRESSURE: 84 MMHG

## 2023-10-04 LAB — TROPONIN T, HIGH SENSITIVITY RESULT: 7 NG/L — SIGNIFICANT CHANGE UP

## 2023-10-04 PROCEDURE — 70553 MRI BRAIN STEM W/O & W/DYE: CPT | Mod: 26,MA

## 2023-10-04 PROCEDURE — 99284 EMERGENCY DEPT VISIT MOD MDM: CPT

## 2023-10-04 PROCEDURE — 72148 MRI LUMBAR SPINE W/O DYE: CPT | Mod: 26,MA

## 2023-10-04 PROCEDURE — 99238 HOSP IP/OBS DSCHRG MGMT 30/<: CPT

## 2023-10-04 PROCEDURE — 72146 MRI CHEST SPINE W/O DYE: CPT | Mod: 26,MA

## 2023-10-04 RX ADMIN — Medication 5 MILLIGRAM(S): at 01:12

## 2023-10-04 RX ADMIN — ATORVASTATIN CALCIUM 10 MILLIGRAM(S): 80 TABLET, FILM COATED ORAL at 12:37

## 2023-10-04 NOTE — CHART NOTE - NSCHARTNOTEFT_GEN_A_CORE
MRI reviewed. No neurosurgical intervention at this time. Neurosurgery signing off. Reconsult PRN. Pt can follow up outpatient with Dr. Berman for meningioma and Dr. Chow for spinal stenosis Case d/w attending.

## 2023-10-04 NOTE — ED CDU PROVIDER DISPOSITION NOTE - NSFOLLOWUPINSTRUCTIONS_ED_ALL_ED_FT
Follow up with your Primary Medical Doctor in 1-2 days.  Follow up with Neurology in 1-2 days call schedule an appointment:  [facility]  Neuroscience [place] [address]  [facility], [address] [place], [place] [id number] [phone]  Follow up with Neurosurgery in 1-2 days Dr. Bryn Berman call to schedule an appointment.  Follow up with your Doctor regarding findings noted and as discussed including " 1.5   cm probable complex cystic pancreatic head lesion incidentally noted, of indeterminate neoplastic-malignant potential." You need further workup and evaluation.  Follow up with Opthalmology in 1-2 days call to schedule an appointment.  Return to the ER for any persistent/worsening or new symptoms weakness, dizziness, numbness, tingling or any concerning symptoms. Follow up with your Primary Medical Doctor in 1-2 days.  Follow up with Neurology in 1-2 days call schedule an appointment:  [facility]  Neuroscience [place] at [address] [address]  [facility], [address] [place], [place] [id number] [phone]  Follow up with Neurosurgery in 1-2 days Dr. Bryn Berman call to schedule an appointment.  Follow up with your Doctor regarding findings noted and as discussed including " 1.5   cm probable complex cystic pancreatic head lesion incidentally noted, of indeterminate neoplastic-malignant potential." You need further workup and evaluation.  Follow up with Opthalmology in 1-2 days call to schedule an appointment.  Return to the ER for any persistent/worsening or new symptoms weakness, dizziness, numbness, tingling or any concerning symptoms.

## 2023-10-04 NOTE — ED CDU PROVIDER DISPOSITION NOTE - CLINICAL COURSE
DIAMANTE Davenport: 73-year-old female with a history of pre diabetes, hyperlipidemia presented to the emergency department complaining of left leg numbness and dizziness x3 days.  Patient placed in CDU for MRIs with both neurology and neurosurgery following.  Patient seen and cleared by neurology this morning advised follow-up outpatient with neurology and ophthalmology.  Patient seen and cleared by neurosurgery advised patient okay for discharge home states patient can follow-up with Dr. Cunningham for meningioma and spinal stenosis not Dr. Vásquez contrary to note follow-up information following provided.  Patient advised of all MRI findings including " 1.5 cm probable complex cystic pancreatic head lesion incidentally noted, of indeterminate neoplastic-malignant potential."  Patient advised of the importance of further work-up and concern for possible malignancy patient states she will follow-up with her PMD this week.  Patient feels well, symptoms resolved, patient ambulating without difficulty.

## 2023-10-04 NOTE — ED CDU PROVIDER DISPOSITION NOTE - ATTENDING APP SHARED VISIT CONTRIBUTION OF CARE
agree with DIAMANTE jones note  "DIAMANTE Davenport: 73-year-old female with a history of pre diabetes, hyperlipidemia presented to the emergency department complaining of left leg numbness and dizziness x3 days.  Patient placed in CDU for MRIs with both neurology and neurosurgery following.  Patient seen and cleared by neurology this morning advised follow-up outpatient with neurology and ophthalmology.  Patient seen and cleared by neurosurgery advised patient okay for discharge home states patient can follow-up with Dr. Cunningham for meningioma and spinal stenosis not Dr. Vásquez contrary to note follow-up information following provided.  Patient advised of all MRI findings including " 1.5 cm probable complex cystic pancreatic head lesion incidentally noted, of indeterminate neoplastic-malignant potential."  Patient advised of the importance of further work-up and concern for possible malignancy patient states she will follow-up with her PMD this week.  Patient feels well, symptoms resolved, patient ambulating without difficulty.  "    pt feels at baseline and is stable for d/c

## 2023-10-04 NOTE — ED CDU PROVIDER SUBSEQUENT DAY NOTE - CLINICAL SUMMARY MEDICAL DECISION MAKING FREE TEXT BOX
Patient is a 74 yo F with history of pre-DM, high cholesterol BIBEMS for evaluation of left leg numbness and difficulty walking and dizziness. Patient states she has chronic left leg neuropathy which has been worse for the past 3 days.  In ED patient lab analysis, unremarkable, pending troponin. CTA Head shows "there is an 8 x 7 x 4 mm calcified extra-axial lesion arising from the planum sphenoidale suggesting a meningioma. This can be further evaluated with nonemergent MRI brain with and without contrast. No acute intracranial hemorrhage, midline shift or extra-axial fluid collection." Pt evaluated by neurology who recommends MRI Brain w/w/o contrast to further characterize findings above in addition to ruling out CVA. Dispo pending.

## 2023-10-04 NOTE — ED CDU PROVIDER DISPOSITION NOTE - PATIENT PORTAL LINK FT
You can access the FollowMyHealth Patient Portal offered by [facility] by registering at the following website: [email or web address]. By joining [facility]’s FollowMyHealth portal, you will also be able to view your health information using other applications (apps) compatible with our system.

## 2023-10-04 NOTE — ED CDU PROVIDER DISPOSITION NOTE - CARE PROVIDER_API CALL
Bryn Berman  Neurosurgery  [address], [address]  [place] [address]  Phone: [phone]  Fax: [phone]  Follow Up Time:

## 2023-10-04 NOTE — ED CDU PROVIDER SUBSEQUENT DAY NOTE - HISTORY
VSS. Pt in no distress with no complaints, sent to MRI. Will continue to monitor.     72 yo F with history of pre-DM, high cholesterol BIBEMS for evaluation of left leg numbness and difficulty walking and dizziness. Patient states she has chronic left leg neuropathy which has been worse for the past 3 days.  In ED patient lab analysis, unremarkable, pending troponin. CTA Head shows "there is an 8 x 7 x 4 mm calcified extra-axial lesion arising from the planum sphenoidale suggesting a meningioma. This can be further evaluated with nonemergent MRI brain with and without contrast. No acute intracranial hemorrhage, midline shift or extra-axial fluid collection." Pt evaluated by neurology who recommends MRI Brain w/w/o contrast to further characterize findings above in addition to ruling out CVA.

## 2023-10-04 NOTE — ED CDU PROVIDER SUBSEQUENT DAY NOTE - ATTENDING APP SHARED VISIT CONTRIBUTION OF CARE
agree with PA note  "72 yo F with history of pre-DM, high cholesterol BIBEMS for evaluation of left leg numbness and difficulty walking and dizziness. Patient states she has chronic left leg neuropathy which has been worse for the past 3 days.  In ED patient lab analysis, unremarkable, pending troponin. CTA Head shows "there is an 8 x 7 x 4 mm calcified extra-axial lesion arising from the planum sphenoidale suggesting a meningioma. This can be further evaluated with nonemergent MRI brain with and without contrast. No acute intracranial hemorrhage, midline shift or extra-axial fluid collection." Pt evaluated by neurology who recommends MRI Brain w/w/o contrast to further characterize findings above in addition to ruling out CVA."    pt feeling asymptomatic this AM.  Has went to MRI awaiting results.  Neuro and neurosurgery agree with PA note  "72 yo F with history of pre-DM, high cholesterol BIBEMS for evaluation of left leg numbness and difficulty walking and dizziness. Patient states she has chronic left leg neuropathy which has been worse for the past 3 days.  In ED patient lab analysis, unremarkable, pending troponin. CTA Head shows "there is an 8 x 7 x 4 mm calcified extra-axial lesion arising from the planum sphenoidale suggesting a meningioma. This can be further evaluated with nonemergent MRI brain with and without contrast. No acute intracranial hemorrhage, midline shift or extra-axial fluid collection." Pt evaluated by neurology who recommends MRI Brain w/w/o contrast to further characterize findings above in addition to ruling out CVA."    pt feeling asymptomatic this AM.  Has went to MRI awaiting results.  Neuro and neurosurgery have evaluated pt.  [facility] has cleared, neurology awaiting MRI brain    pt with no complaints this AM  exam wnl

## 2023-10-06 PROBLEM — E78.00 PURE HYPERCHOLESTEROLEMIA, UNSPECIFIED: Chronic | Status: ACTIVE | Noted: 2023-10-03

## 2023-10-23 ENCOUNTER — NON-APPOINTMENT (OUTPATIENT)
Age: 73
End: 2023-10-23

## 2023-10-23 ENCOUNTER — APPOINTMENT (OUTPATIENT)
Dept: NEUROSURGERY | Facility: CLINIC | Age: 73
End: 2023-10-23
Payer: MEDICARE

## 2023-10-23 VITALS
HEART RATE: 73 BPM | SYSTOLIC BLOOD PRESSURE: 137 MMHG | HEIGHT: 63 IN | BODY MASS INDEX: 28.35 KG/M2 | WEIGHT: 160 LBS | DIASTOLIC BLOOD PRESSURE: 74 MMHG | OXYGEN SATURATION: 97 %

## 2023-10-23 DIAGNOSIS — D32.9 BENIGN NEOPLASM OF MENINGES, UNSPECIFIED: ICD-10-CM

## 2023-10-23 DIAGNOSIS — E78.5 HYPERLIPIDEMIA, UNSPECIFIED: ICD-10-CM

## 2023-10-23 DIAGNOSIS — M54.2 CERVICALGIA: ICD-10-CM

## 2023-10-23 PROCEDURE — 99204 OFFICE O/P NEW MOD 45 MIN: CPT

## 2023-10-23 RX ORDER — ATORVASTATIN CALCIUM 10 MG/1
10 TABLET, FILM COATED ORAL
Refills: 0 | Status: ACTIVE | COMMUNITY

## 2023-12-11 ENCOUNTER — APPOINTMENT (OUTPATIENT)
Dept: NEUROSURGERY | Facility: CLINIC | Age: 73
End: 2023-12-11

## 2023-12-12 ENCOUNTER — APPOINTMENT (OUTPATIENT)
Dept: NEUROLOGY | Facility: CLINIC | Age: 73
End: 2023-12-12

## 2025-02-19 NOTE — ED PROVIDER NOTE - PROGRESS NOTE DETAILS
Received refill request for [id number] Mg from [facility].     Last OV: 07/30/24    Next OV: 08/01/25    Last Labs: Lipids 01/15/25    Last Filled: 07/10/24   DIAMANTE Solano: received patient in sign out, Pt is a 72 YO F with PMH Pre DM, HLD who presented to ED with dizziness, LLE paresthesia and LLE "heaviness" x 3 days. Pt had CT brain, CTA H/N which was significant for [id number] calcified meningioma. Will consult neurology and neurosurgery, further workup/dispo based off their recs. Genet ROSENBERG: Results discussed in detail with patient and her sister. A copy of results was provided. Patient found to have incidental small meningioma. Neurology and Nsx consulted. Neurology saw patient, recommend MRI imaging. Patient to go to CDU for MRI imaging and final recommendations. DIAMANTE Solano: received patient in sign out, Pt is a 72 YO F with PMH Pre DM, HLD who presented to ED with dizziness, LLE paresthesia and LLE "heaviness" x 3 days. Pt had CT brain, CTA H/N which was significant for [id number] calcified meningioma. Will consult neurology and neurosurgery, further workup/dispo based off their recs.    Reassessment: discussed with neurology and neurosurgery, recommending further work up and with MRI B w/wout contrast. Pt and family at bedside updated and in agreement with plan. Discussed with CDU PA.